# Patient Record
Sex: MALE | Race: WHITE | NOT HISPANIC OR LATINO | Employment: PART TIME | URBAN - METROPOLITAN AREA
[De-identification: names, ages, dates, MRNs, and addresses within clinical notes are randomized per-mention and may not be internally consistent; named-entity substitution may affect disease eponyms.]

---

## 2024-06-06 ENCOUNTER — HOSPITAL ENCOUNTER (EMERGENCY)
Facility: HOSPITAL | Age: 19
Discharge: HOME | End: 2024-06-06
Attending: STUDENT IN AN ORGANIZED HEALTH CARE EDUCATION/TRAINING PROGRAM
Payer: COMMERCIAL

## 2024-06-06 ENCOUNTER — APPOINTMENT (OUTPATIENT)
Dept: RADIOLOGY | Facility: HOSPITAL | Age: 19
End: 2024-06-06
Payer: COMMERCIAL

## 2024-06-06 VITALS
HEART RATE: 50 BPM | RESPIRATION RATE: 16 BRPM | HEIGHT: 64 IN | SYSTOLIC BLOOD PRESSURE: 140 MMHG | BODY MASS INDEX: 22.71 KG/M2 | DIASTOLIC BLOOD PRESSURE: 93 MMHG | TEMPERATURE: 98.6 F | OXYGEN SATURATION: 99 % | WEIGHT: 133 LBS

## 2024-06-06 DIAGNOSIS — S62.306A CLOSED DISPLACED FRACTURE OF FIFTH METACARPAL BONE OF RIGHT HAND, UNSPECIFIED PORTION OF METACARPAL, INITIAL ENCOUNTER: Primary | ICD-10-CM

## 2024-06-06 PROCEDURE — 2500000001 HC RX 250 WO HCPCS SELF ADMINISTERED DRUGS (ALT 637 FOR MEDICARE OP): Performed by: STUDENT IN AN ORGANIZED HEALTH CARE EDUCATION/TRAINING PROGRAM

## 2024-06-06 PROCEDURE — 73130 X-RAY EXAM OF HAND: CPT | Mod: RIGHT SIDE | Performed by: RADIOLOGY

## 2024-06-06 PROCEDURE — 73130 X-RAY EXAM OF HAND: CPT | Mod: RT

## 2024-06-06 PROCEDURE — 29125 APPL SHORT ARM SPLINT STATIC: CPT | Mod: RT

## 2024-06-06 PROCEDURE — 99283 EMERGENCY DEPT VISIT LOW MDM: CPT | Mod: 25

## 2024-06-06 RX ORDER — BACITRACIN ZINC 500 UNIT/G
OINTMENT IN PACKET (EA) TOPICAL ONCE
Status: COMPLETED | OUTPATIENT
Start: 2024-06-06 | End: 2024-06-06

## 2024-06-06 RX ORDER — ACETAMINOPHEN 325 MG/1
975 TABLET ORAL ONCE
Status: COMPLETED | OUTPATIENT
Start: 2024-06-06 | End: 2024-06-06

## 2024-06-06 RX ORDER — IBUPROFEN 600 MG/1
600 TABLET ORAL ONCE
Status: COMPLETED | OUTPATIENT
Start: 2024-06-06 | End: 2024-06-06

## 2024-06-06 RX ADMIN — ACETAMINOPHEN 975 MG: 325 TABLET ORAL at 20:34

## 2024-06-06 RX ADMIN — BACITRACIN 1 APPLICATION: 500 OINTMENT TOPICAL at 18:43

## 2024-06-06 RX ADMIN — IBUPROFEN 600 MG: 600 TABLET, FILM COATED ORAL at 20:34

## 2024-06-06 ASSESSMENT — PAIN - FUNCTIONAL ASSESSMENT: PAIN_FUNCTIONAL_ASSESSMENT: 0-10

## 2024-06-06 ASSESSMENT — COLUMBIA-SUICIDE SEVERITY RATING SCALE - C-SSRS
6. HAVE YOU EVER DONE ANYTHING, STARTED TO DO ANYTHING, OR PREPARED TO DO ANYTHING TO END YOUR LIFE?: NO
2. HAVE YOU ACTUALLY HAD ANY THOUGHTS OF KILLING YOURSELF?: NO
1. IN THE PAST MONTH, HAVE YOU WISHED YOU WERE DEAD OR WISHED YOU COULD GO TO SLEEP AND NOT WAKE UP?: NO

## 2024-06-06 ASSESSMENT — PAIN SCALES - GENERAL: PAINLEVEL_OUTOF10: 6

## 2024-06-06 ASSESSMENT — LIFESTYLE VARIABLES
TOTAL SCORE: 0
EVER HAD A DRINK FIRST THING IN THE MORNING TO STEADY YOUR NERVES TO GET RID OF A HANGOVER: NO
HAVE YOU EVER FELT YOU SHOULD CUT DOWN ON YOUR DRINKING: NO
HAVE PEOPLE ANNOYED YOU BY CRITICIZING YOUR DRINKING: NO
EVER FELT BAD OR GUILTY ABOUT YOUR DRINKING: NO

## 2024-06-06 NOTE — ED PROVIDER NOTES
HPI   Chief Complaint   Patient presents with    Hand Injury     Pt was mad and punched a wall with right arm       Patient is a 18-year-old male with no significant past medical history who presents for hand pain.  Patient was angry and punched a door just prior to arrival.  Endorses pain over the ulnar side of his right hand.  No pain in other locations.  No pain in the wrist or elbow.  Patient is up-to-date on his vaccines. No numbness or tingling in the hand.  Patient has been applying ice to the side of his hand where it hurts.                          [place] Coma Scale Score: 15                     Patient History   History reviewed. No pertinent past medical history.  History reviewed. No pertinent surgical history.  No family history on file.  Social History     Tobacco Use    Smoking status: Not on file    Smokeless tobacco: Not on file   Substance Use Topics    Alcohol use: Not on file    Drug use: Not on file       Physical Exam   ED Triage Vitals [06/06/24 1729]   Temperature Heart Rate Respirations BP   37 °C (98.6 °F) 50 16 (!) 140/93      Pulse Ox Temp src Heart Rate Source Patient Position   99 % -- -- --      BP Location FiO2 (%)     -- --       Physical Exam  Constitutional:       General: He is not in acute distress.  HENT:      Head: Normocephalic.   Eyes:      Extraocular Movements: Extraocular movements intact.      Conjunctiva/sclera: Conjunctivae normal.      Pupils: Pupils are equal, round, and reactive to light.   Cardiovascular:      Rate and Rhythm: Normal rate and regular rhythm.      Pulses: Normal pulses.      Heart sounds: Normal heart sounds.   Pulmonary:      Effort: Pulmonary effort is normal.      Breath sounds: Normal breath sounds.   Abdominal:      General: There is no distension.      Palpations: Abdomen is soft. There is no mass.      Tenderness: There is no abdominal tenderness. There is no guarding.   Musculoskeletal:         General: No deformity.      Cervical back:  Normal range of motion and neck supple.      Right lower leg: No edema.      Left lower leg: No edema.      Comments: Tenderness palpation over ulnar aspect of right hand, mild deformation to the fifth MCP joint. Abrasions over knuckles, no obvious open fractures.  2+ radial pulses bilaterally.  Slight decrease in sensation over area of cold pack application.  No distal sensation deficits in all distributions on right hand.  No tenderness with range of motion of right wrist or elbow.  No tenderness palpation of the right wrist/snuffbox/elbow or upper arm.   Skin:     General: Skin is warm and dry.      Findings: No lesion or rash.   Neurological:      General: No focal deficit present.      Mental Status: He is alert and oriented to person, place, and time. Mental status is at baseline.      Cranial Nerves: No cranial nerve deficit.      Sensory: No sensory deficit.      Motor: No weakness.   Psychiatric:         Mood and Affect: Mood normal.         ED Course & MDM   Diagnoses as of 06/06/24 2031   Closed displaced fracture of fifth metacarpal bone of right hand, unspecified portion of metacarpal, initial encounter       Medical Decision Making  Patient is a 18-year-old male with above-stated past medical presents for hand pain.  Patient has no snuffbox tenderness, no occasion for a thumb spica.  Patient's x-rays on my review did show a fracture of the fifth metacarpal, this was confirmed by radiology.  No open wound, patient's tetanus is up-to-date.  I was able to reduce the deformity moderately.  Patient's hand was placed in an ulnar gutter splint by nursing, I reevaluated the patient afterwards and he has less than 2-second capillary refill all fingers and sensation is intact.  I discussed strict return precautions with him, he stated understanding agreement.  Patient was advised to follow-up with orthopedics in the clinic tomorrow morning, he stated understanding agreement.  Patient was discharged home in  stable condition.    Disclaimer: This note was dictated using speech recognition software. Minor errors in transcription may be present. Please call if questions.     Luis Kapadia MD  [facility] Emergency Medicine  Contact on [employer]        Problems Addressed:  Closed displaced fracture of fifth metacarpal bone of right hand, unspecified portion of metacarpal, initial encounter: acute illness or injury    Amount and/or Complexity of Data Reviewed  Radiology: ordered and independent interpretation performed.        Procedure  Procedures     Luis Kapadia MD  06/06/24 0983

## 2024-06-07 ENCOUNTER — HOSPITAL ENCOUNTER (OUTPATIENT)
Dept: RADIOLOGY | Facility: CLINIC | Age: 19
Discharge: HOME | End: 2024-06-07
Payer: COMMERCIAL

## 2024-06-07 ENCOUNTER — OFFICE VISIT (OUTPATIENT)
Dept: ORTHOPEDIC SURGERY | Facility: CLINIC | Age: 19
End: 2024-06-07
Payer: COMMERCIAL

## 2024-06-07 DIAGNOSIS — S62.339A BOXER'S FRACTURE, CLOSED, INITIAL ENCOUNTER: ICD-10-CM

## 2024-06-07 DIAGNOSIS — M79.641 PAIN OF RIGHT HAND: ICD-10-CM

## 2024-06-07 DIAGNOSIS — S62.339A BOXER'S FRACTURE, CLOSED, INITIAL ENCOUNTER: Primary | ICD-10-CM

## 2024-06-07 PROCEDURE — 99204 OFFICE O/P NEW MOD 45 MIN: CPT | Performed by: INTERNAL MEDICINE

## 2024-06-07 PROCEDURE — 26605 TREAT METACARPAL FRACTURE: CPT | Performed by: INTERNAL MEDICINE

## 2024-06-07 PROCEDURE — 73130 X-RAY EXAM OF HAND: CPT | Mod: RIGHT SIDE | Performed by: INTERNAL MEDICINE

## 2024-06-07 PROCEDURE — 99213 OFFICE O/P EST LOW 20 MIN: CPT | Mod: 57 | Performed by: INTERNAL MEDICINE

## 2024-06-07 PROCEDURE — 73130 X-RAY EXAM OF HAND: CPT | Mod: RT

## 2024-06-07 PROCEDURE — 73130 X-RAY EXAM OF HAND: CPT | Mod: 76,RT

## 2024-06-07 RX ORDER — IBUPROFEN 800 MG/1
800 TABLET ORAL 3 TIMES DAILY
Qty: 90 TABLET | Refills: 0 | Status: SHIPPED | OUTPATIENT
Start: 2024-06-07 | End: 2024-07-07

## 2024-06-07 NOTE — PROGRESS NOTES
Acute Injury New Patient Visit    CC:   Chief Complaint   Patient presents with    Right Hand - Pain     Punched wall  DOI 06/06/24  X rays [facility]       HPI: Franko is a 18 y.o. male presents today for evaluation for acute right hand injury sustained yesterday after he punched a wall. He had x-rays taken at the ER. He was placed into a splint. He notes that the fracture was attempted to be reduced in the hospital but no repeat x-rays were taken. He is here for initial evaluation and x-rays.         Review of Systems   GENERAL: Negative for malaise, significant weight loss, fever  MUSCULOSKELETAL: See HPI  NEURO:  Negative for numbness / tingling     Past Medical History  No past medical history on file.    Medication review  Medication Documentation Review Audit       Reviewed by Maryann Childress RN (Registered Nurse) on 06/06/24 at 1731      Medication Order Taking? Sig Documenting Provider Last Dose Status            No Medications to Display                                   Allergies  No Known Allergies    Social History  Social History     Socioeconomic History    Marital status: Single     Spouse name: Not on file    Number of children: Not on file    Years of education: Not on file    Highest education level: Not on file   Occupational History    Not on file   Tobacco Use    Smoking status: Not on file    Smokeless tobacco: Not on file   Substance and Sexual Activity    Alcohol use: Not on file    Drug use: Not on file    Sexual activity: Not on file   Other Topics Concern    Not on file   Social History Narrative    Not on file     Social Determinants of Health     Financial Resource Strain: Not on file   Food Insecurity: Not on file   Transportation Needs: Not on file   Physical Activity: Not on file   Stress: Not on file   Social Connections: Not on file   Intimate Partner Violence: Not on file   Housing Stability: Not on file       Surgical History  No past surgical history on file.    Physical  Exam:  GENERAL:  Patient is awake, alert, and oriented to person place and time.  Patient appears well nourished and well kept.  Affect Calm, Not Acutely Distressed.  HEENT:  Normocephalic, Atraumatic, EOMI  CARDIOVASCULAR:  Hemodynamically stable.  RESPIRATORY:  Normal respirations with unlabored breathing.  Extremity: Right hand shows swelling the right hand with slight bruising.  There is no clinical signs infection.  There is no pain in the distal radius or distal ulna.  There is no pain over the scaphoid bone.  Pain over the fifth metacarpal bone.  His flexor and extensor mechanism intact.  No obvious rotational defect.  Satisfactory capillary refill time.  Left hand was examined for comparison.      Diagnostics: X-rays reviewed  XR hand right 3+ views  Narrative: STUDY:  Hand Radiographs; 06/06/2024 6:09 PM  INDICATION:  Fifth finger and metacarpal pain, possible Boxer's fracture.  COMPARISON:  None Available.  ACCESSION NUMBER(S):  [id number]  ORDERING CLINICIAN:  ZAC CELAYA  TECHNIQUE:  Four views of the right hand.  FINDINGS:    There is a fracture the distal right fifth metacarpal.  There is volar  angulation of the distal fracture fragment.  No soft tissue  abnormality is seen.  Impression: Fracture the right fifth metacarpal.  Signed by Igor Rucker MD      Procedure: Closed reduction of displaced right fifth metacarpal fracture.  The skin was sterilized with Betadine, under sterile precaution 6 cc of lidocaine was used for digital block.  Closed reduction was done in the short arm boxers cast, repeat x-rays were done afterwards showed improved alignment.    Assessment: Acute displaced distal fifth metacarpal fracture    Plan: Franko presents today for initial evaluation for acute right hand injury sustained yesterday after he punched a wall.  He sustained a displaced distal fifth metacarpal fracture.  We did recommend close reduction today in the office he was agreeable for procedure.  Repeat  x-rays in the cast after closed reduction shows improved alignment.  He will follow-up in 1 week for repeat x-rays of the right hand 3 views in the cast AP, lateral and oblique views.    No orders of the defined types were placed in this encounter.     At the conclusion of the visit there were no further questions by the patient/family regarding their plan of care.  Patient was instructed to call or return with any issues, questions, or concerns regarding their injury and/or treatment plan described above.     06/07/24 at 9:00 AM - Katja Hernandez MD  Scribe Attestation  By signing my name below, I, Godfrey July, Scribe   attest that this documentation has been prepared under the direction and in the presence of Katja Hernandez MD.    Office: [phone]    This note was prepared using voice recognition software.  The details of this note are correct and have been reviewed, and corrected to the best of my ability.  Some grammatical errors may persist related to the Dragon software.

## 2024-06-13 ENCOUNTER — HOSPITAL ENCOUNTER (OUTPATIENT)
Dept: RADIOLOGY | Facility: CLINIC | Age: 19
Discharge: HOME | End: 2024-06-13
Payer: COMMERCIAL

## 2024-06-13 ENCOUNTER — OFFICE VISIT (OUTPATIENT)
Dept: ORTHOPEDIC SURGERY | Facility: CLINIC | Age: 19
End: 2024-06-13
Payer: COMMERCIAL

## 2024-06-13 DIAGNOSIS — S62.339A BOXER'S FRACTURE, CLOSED, INITIAL ENCOUNTER: ICD-10-CM

## 2024-06-13 PROCEDURE — 73130 X-RAY EXAM OF HAND: CPT | Mod: RT

## 2024-06-13 NOTE — PROGRESS NOTES
CC:   No chief complaint on file.      HPI: Franko is a 18 y.o. male presents today for reevaluation for right displaced distal fifth metacarpal fracture. He states that he is doing well. Repeat x-rays in the cast today after closed reduction in the cast last week.         Review of Systems   GENERAL: Negative for malaise, significant weight loss, fever  MUSCULOSKELETAL: See HPI  NEURO:  Negative for numbness / tingling     Past Medical History  No past medical history on file.    Medication review  Medication Documentation Review Audit       Reviewed by Maryann Childress RN (Registered Nurse) on 06/06/24 at 1731      Medication Order Taking? Sig Documenting Provider Last Dose Status            No Medications to Display                                   Allergies  No Known Allergies    Social History  Social History     Socioeconomic History    Marital status: Single     Spouse name: Not on file    Number of children: Not on file    Years of education: Not on file    Highest education level: Not on file   Occupational History    Not on file   Tobacco Use    Smoking status: Not on file    Smokeless tobacco: Not on file   Substance and Sexual Activity    Alcohol use: Not on file    Drug use: Not on file    Sexual activity: Not on file   Other Topics Concern    Not on file   Social History Narrative    Not on file     Social Determinants of Health     Financial Resource Strain: Not on file   Food Insecurity: Not on file   Transportation Needs: Not on file   Physical Activity: Not on file   Stress: Not on file   Social Connections: Not on file   Intimate Partner Violence: Not on file   Housing Stability: Not on file       Surgical History  No past surgical history on file.    Physical Exam:  GENERAL:  Patient is awake, alert, and oriented to person place and time.  Patient appears well nourished and well kept.  Affect Calm, Not Acutely Distressed.  HEENT:  Normocephalic, Atraumatic, EOMI  CARDIOVASCULAR:   Hemodynamically stable.  RESPIRATORY:  Normal respirations with unlabored breathing.  Extremity: Right hand shows short arm boxers cast intact.  Distal pulses are palpable.  Satisfactory capillary refill time.      Diagnostics: X-rays reviewed  XR hand right 3+ views  Interpreted By:  Katja Love,   STUDY:  XR HAND RIGHT 3+ VIEWS, 6/7/2024 10:19 am      INDICATION:  Signs/Symptoms:Post Reduction      ACCESSION NUMBER(S):  [id number]      ORDERING CLINICIAN:  KATJA LOVE      FINDINGS:  Right hand x-rays three views in cast status post close reduction:  Improved alignment of recent displaced distal 5th metacarpal fracture.          Signed by: Katja Love 6/7/2024 12:36 PM  Dictation workstation:   [id number]  XR hand right 3+ views  Interpreted By:  Katja Love,   STUDY:  XR HAND RIGHT 3+ VIEWS, 6/7/2024 9:19 am      INDICATION:  Signs/Symptoms:pain      ACCESSION NUMBER(S):  [id number]      ORDERING CLINICIAN:  KATJA LOVE      FINDINGS:  Right hand x-rays three views AP, lateral and oblique view: Displaced  distal 5th metacarpal fracture, with volar angulation.          Signed by: Katja Love 6/7/2024 12:36 PM  Dictation workstation:   [id number]        Procedure: None    Assessment: Displaced distal fifth metacarpal fracture status post closed reduction in the cast    Plan: Franko  presents today for reevaluation for right displaced distal fifth metacarpal fracture status post closed reduction in the cast. He is clinically doing well. X-rays showed a stable appearing fracture, no further displacement. He will follow-up in 2 weeks for cast removal and repeat x-rays of the right hand 3 views out out of the cast, AP, lateral and oblique views. If doing well, we will place him into a boxer fracture brace.    No orders of the defined types were placed in this encounter.     At the conclusion of the visit there were no further questions by the patient/family regarding their plan of care.   Patient was instructed to call or return with any issues, questions, or concerns regarding their injury and/or treatment plan described above.     06/13/24 at 10:06 AM - Katja Hernandez MD  Scribe Attestation  By signing my name below, I, Godfrey Mcdowell, Scribe   attest that this documentation has been prepared under the direction and in the presence of Katja Hernandez MD.    Office: [phone]    This note was prepared using voice recognition software.  The details of this note are correct and have been reviewed, and corrected to the best of my ability.  Some grammatical errors may persist related to the Dragon software.

## 2024-06-27 ENCOUNTER — OFFICE VISIT (OUTPATIENT)
Dept: ORTHOPEDIC SURGERY | Facility: CLINIC | Age: 19
End: 2024-06-27
Payer: COMMERCIAL

## 2024-06-27 ENCOUNTER — HOSPITAL ENCOUNTER (OUTPATIENT)
Dept: RADIOLOGY | Facility: CLINIC | Age: 19
Discharge: HOME | End: 2024-06-27
Payer: COMMERCIAL

## 2024-06-27 ENCOUNTER — APPOINTMENT (OUTPATIENT)
Dept: ORTHOPEDIC SURGERY | Facility: CLINIC | Age: 19
End: 2024-06-27
Payer: COMMERCIAL

## 2024-06-27 DIAGNOSIS — S62.339A BOXER'S FRACTURE, CLOSED, INITIAL ENCOUNTER: ICD-10-CM

## 2024-06-27 PROCEDURE — 99024 POSTOP FOLLOW-UP VISIT: CPT | Performed by: INTERNAL MEDICINE

## 2024-06-27 PROCEDURE — 73130 X-RAY EXAM OF HAND: CPT | Mod: RT

## 2024-06-27 PROCEDURE — 73130 X-RAY EXAM OF HAND: CPT | Mod: RIGHT SIDE | Performed by: INTERNAL MEDICINE

## 2024-06-27 PROCEDURE — L3809 WHFO W/O JOINTS PRE OTS: HCPCS | Performed by: INTERNAL MEDICINE

## 2024-06-27 NOTE — PROGRESS NOTES
CC:   Chief Complaint   Patient presents with    Right Hand - Follow-up     Displaced distal fifth metacarpal fracture status post closed reduction in the cast  Xrays today XOP         HPI: Franko is a 18 y.o. male presents today for reevaluation for displaced distal fifth metacarpal fracture status post closed reduction in the cast. He states that he is doing well. Repeat x-rays out of the cast today.          Review of Systems   GENERAL: Negative for malaise, significant weight loss, fever  MUSCULOSKELETAL: See HPI  NEURO:  Negative for numbness / tingling     Past Medical History  No past medical history on file.    Medication review  Medication Documentation Review Audit       Reviewed by Maryann Childress RN (Registered Nurse) on 06/06/24 at 1731      Medication Order Taking? Sig Documenting Provider Last Dose Status            No Medications to Display                                   Allergies  No Known Allergies    Social History  Social History     Socioeconomic History    Marital status: Single     Spouse name: Not on file    Number of children: Not on file    Years of education: Not on file    Highest education level: Not on file   Occupational History    Not on file   Tobacco Use    Smoking status: Not on file    Smokeless tobacco: Not on file   Substance and Sexual Activity    Alcohol use: Not on file    Drug use: Not on file    Sexual activity: Not on file   Other Topics Concern    Not on file   Social History Narrative    Not on file     Social Determinants of Health     Financial Resource Strain: Not on file   Food Insecurity: Not on file   Transportation Needs: Not on file   Physical Activity: Not on file   Stress: Not on file   Social Connections: Not on file   Intimate Partner Violence: Not on file   Housing Stability: Not on file       Surgical History  No past surgical history on file.    Physical Exam:  GENERAL:  Patient is awake, alert, and oriented to person place and time.  Patient  appears well nourished and well kept.  Affect Calm, Not Acutely Distressed.  HEENT:  Normocephalic, Atraumatic, EOMI  CARDIOVASCULAR:  Hemodynamically stable.  RESPIRATORY:  Normal respirations with unlabored breathing.  Extremity: Right hand shows skin is intact.  There is no pain the distal radius or distal ulna.  There is no pain in the scaphoid bone.  No pain in the fifth metacarpal bone.  His flexor and extensor mechanism intact.  There is no obvious rotational defect.  No clinical signs infection.      Diagnostics: X-rays reviewed  XR hand right 3+ views  Interpreted By:  Katja Love,   STUDY:  XR HAND RIGHT 3+ VIEWS, 6/13/2024 10:21 am      INDICATION:  Signs/Symptoms:pain      ACCESSION NUMBER(S):  [id number]      ORDERING CLINICIAN:  KATJA LOVE      FINDINGS:  Right hand x-rays three views in cast AP, lateral and oblique view:  Stable appearing distal 5th metacarpal fracture, with no further  displacement when compared to previous imaging. In satisfactory  anatomical position.          Signed by: Katja Love 6/13/2024 12:05 PM  Dictation workstation:   [id number]        Procedure: None    Assessment: Displaced distal fifth metacarpal fracture status post closed reduction in the cast     Plan: Franko presents today for reevaluation for right displaced distal fifth metacarpal fracture status post closed reduction in the cast. He is clinically doing well. X-rays showed a stable appearing fracture, no further displacement. We placed him into a boxer fracture brace wrist free. Early passive range of motion exercises to avoid stiffness.  He will follow-up in 2-3 weeks, repeat x-rays of the right hand 3 views out out of the cast, AP, lateral and oblique views.     Orders Placed This Encounter    XR hand right 3+ views      At the conclusion of the visit there were no further questions by the patient/family regarding their plan of care.  Patient was instructed to call or return with any issues,  questions, or concerns regarding their injury and/or treatment plan described above.     06/27/24 at 10:44 AM - Katja Hernandez MD  Scribe Attestation  By signing my name below, I, Godfrey Mcdowell, Scribe   attest that this documentation has been prepared under the direction and in the presence of Katja Hernandez MD.    Office: [phone]    This note was prepared using voice recognition software.  The details of this note are correct and have been reviewed, and corrected to the best of my ability.  Some grammatical errors may persist related to the Dragon software.

## 2024-07-23 ENCOUNTER — APPOINTMENT (OUTPATIENT)
Dept: ORTHOPEDIC SURGERY | Facility: CLINIC | Age: 19
End: 2024-07-23
Payer: COMMERCIAL

## 2024-08-08 ENCOUNTER — APPOINTMENT (OUTPATIENT)
Dept: ORTHOPEDIC SURGERY | Facility: CLINIC | Age: 19
End: 2024-08-08
Payer: COMMERCIAL

## 2024-08-08 DIAGNOSIS — S62.339A BOXER'S FRACTURE, CLOSED, INITIAL ENCOUNTER: ICD-10-CM
